# Patient Record
Sex: MALE | Race: WHITE | Employment: UNEMPLOYED | URBAN - NONMETROPOLITAN AREA
[De-identification: names, ages, dates, MRNs, and addresses within clinical notes are randomized per-mention and may not be internally consistent; named-entity substitution may affect disease eponyms.]

---

## 2019-07-24 ENCOUNTER — HOSPITAL ENCOUNTER (INPATIENT)
Age: 29
LOS: 9 days | Discharge: HOME | DRG: 885 | End: 2019-08-02
Attending: FAMILY MEDICINE | Admitting: PSYCHIATRY & NEUROLOGY
Payer: COMMERCIAL

## 2019-07-24 DIAGNOSIS — F32.A DEPRESSION WITH SUICIDAL IDEATION: Primary | ICD-10-CM

## 2019-07-24 DIAGNOSIS — R45.851 DEPRESSION WITH SUICIDAL IDEATION: Primary | ICD-10-CM

## 2019-07-24 LAB
ACETAMINOPHEN LEVEL: <15 UG/ML
ALBUMIN SERPL-MCNC: 3.9 G/DL (ref 3.5–5.2)
ALP BLD-CCNC: 66 U/L (ref 40–130)
ALT SERPL-CCNC: 55 U/L (ref 5–41)
AMPHETAMINE SCREEN, URINE: NEGATIVE
ANION GAP SERPL CALCULATED.3IONS-SCNC: 11 MMOL/L (ref 7–19)
APTT: 24.5 SEC (ref 26–36.2)
AST SERPL-CCNC: 36 U/L (ref 5–40)
BARBITURATE SCREEN URINE: NEGATIVE
BASOPHILS ABSOLUTE: 0.1 K/UL (ref 0–0.2)
BASOPHILS RELATIVE PERCENT: 0.8 % (ref 0–1)
BENZODIAZEPINE SCREEN, URINE: NEGATIVE
BILIRUB SERPL-MCNC: 0.4 MG/DL (ref 0.2–1.2)
BILIRUBIN URINE: NEGATIVE
BLOOD, URINE: NEGATIVE
BUN BLDV-MCNC: 14 MG/DL (ref 6–20)
CALCIUM SERPL-MCNC: 9.3 MG/DL (ref 8.6–10)
CANNABINOID SCREEN URINE: NEGATIVE
CHLORIDE BLD-SCNC: 104 MMOL/L (ref 98–111)
CLARITY: CLEAR
CO2: 28 MMOL/L (ref 22–29)
COCAINE METABOLITE SCREEN URINE: NEGATIVE
COLOR: YELLOW
CREAT SERPL-MCNC: 0.9 MG/DL (ref 0.5–1.2)
EOSINOPHILS ABSOLUTE: 0.3 K/UL (ref 0–0.6)
EOSINOPHILS RELATIVE PERCENT: 3.9 % (ref 0–5)
ETHANOL: <10 MG/DL (ref 0–0.08)
GFR NON-AFRICAN AMERICAN: >60
GLUCOSE BLD-MCNC: 99 MG/DL (ref 74–109)
GLUCOSE URINE: NEGATIVE MG/DL
HCT VFR BLD CALC: 40.2 % (ref 42–52)
HEMOGLOBIN: 12.7 G/DL (ref 14–18)
INR BLD: 1.04 (ref 0.88–1.18)
KETONES, URINE: NEGATIVE MG/DL
LEUKOCYTE ESTERASE, URINE: NEGATIVE
LYMPHOCYTES ABSOLUTE: 2.3 K/UL (ref 1.1–4.5)
LYMPHOCYTES RELATIVE PERCENT: 27.4 % (ref 20–40)
Lab: NORMAL
MCH RBC QN AUTO: 31.5 PG (ref 27–31)
MCHC RBC AUTO-ENTMCNC: 31.6 G/DL (ref 33–37)
MCV RBC AUTO: 99.8 FL (ref 80–94)
MONOCYTES ABSOLUTE: 0.6 K/UL (ref 0–0.9)
MONOCYTES RELATIVE PERCENT: 7.4 % (ref 0–10)
NEUTROPHILS ABSOLUTE: 5.1 K/UL (ref 1.5–7.5)
NEUTROPHILS RELATIVE PERCENT: 59.9 % (ref 50–65)
NITRITE, URINE: NEGATIVE
OPIATE SCREEN URINE: NEGATIVE
PDW BLD-RTO: 14.6 % (ref 11.5–14.5)
PH UA: 7 (ref 5–8)
PLATELET # BLD: 175 K/UL (ref 130–400)
PMV BLD AUTO: 10.4 FL (ref 9.4–12.4)
POTASSIUM SERPL-SCNC: 4.3 MMOL/L (ref 3.5–5)
PROTEIN UA: NEGATIVE MG/DL
PROTHROMBIN TIME: 13 SEC (ref 12–14.6)
RBC # BLD: 4.03 M/UL (ref 4.7–6.1)
SALICYLATE, SERUM: <3 MG/DL (ref 3–10)
SODIUM BLD-SCNC: 143 MMOL/L (ref 136–145)
SPECIFIC GRAVITY UA: 1.01 (ref 1–1.03)
T4 TOTAL: 6.6 UG/DL (ref 4.5–11.7)
TOTAL PROTEIN: 6.7 G/DL (ref 6.6–8.7)
TSH SERPL DL<=0.05 MIU/L-ACNC: 2.49 UIU/ML (ref 0.27–4.2)
URINE REFLEX TO CULTURE: NORMAL
UROBILINOGEN, URINE: 1 E.U./DL
WBC # BLD: 8.5 K/UL (ref 4.8–10.8)

## 2019-07-24 PROCEDURE — G0480 DRUG TEST DEF 1-7 CLASSES: HCPCS

## 2019-07-24 PROCEDURE — 81003 URINALYSIS AUTO W/O SCOPE: CPT

## 2019-07-24 PROCEDURE — 84436 ASSAY OF TOTAL THYROXINE: CPT

## 2019-07-24 PROCEDURE — 85730 THROMBOPLASTIN TIME PARTIAL: CPT

## 2019-07-24 PROCEDURE — 99285 EMERGENCY DEPT VISIT HI MDM: CPT | Performed by: FAMILY MEDICINE

## 2019-07-24 PROCEDURE — 80307 DRUG TEST PRSMV CHEM ANLYZR: CPT

## 2019-07-24 PROCEDURE — 84443 ASSAY THYROID STIM HORMONE: CPT

## 2019-07-24 PROCEDURE — 36415 COLL VENOUS BLD VENIPUNCTURE: CPT

## 2019-07-24 PROCEDURE — 85025 COMPLETE CBC W/AUTO DIFF WBC: CPT

## 2019-07-24 PROCEDURE — 85610 PROTHROMBIN TIME: CPT

## 2019-07-24 PROCEDURE — 6370000000 HC RX 637 (ALT 250 FOR IP): Performed by: FAMILY MEDICINE

## 2019-07-24 PROCEDURE — 6370000000 HC RX 637 (ALT 250 FOR IP): Performed by: NURSE PRACTITIONER

## 2019-07-24 PROCEDURE — 1240000000 HC EMOTIONAL WELLNESS R&B

## 2019-07-24 PROCEDURE — 99285 EMERGENCY DEPT VISIT HI MDM: CPT

## 2019-07-24 PROCEDURE — 80053 COMPREHEN METABOLIC PANEL: CPT

## 2019-07-24 RX ORDER — ACETAMINOPHEN 325 MG/1
650 TABLET ORAL EVERY 4 HOURS PRN
Status: DISCONTINUED | OUTPATIENT
Start: 2019-07-24 | End: 2019-08-02 | Stop reason: HOSPADM

## 2019-07-24 RX ORDER — TRAZODONE HYDROCHLORIDE 50 MG/1
50 TABLET ORAL NIGHTLY
Status: DISCONTINUED | OUTPATIENT
Start: 2019-07-24 | End: 2019-07-24

## 2019-07-24 RX ORDER — HYDROXYZINE HYDROCHLORIDE 10 MG/1
10 TABLET, FILM COATED ORAL 3 TIMES DAILY PRN
Status: DISCONTINUED | OUTPATIENT
Start: 2019-07-24 | End: 2019-07-25

## 2019-07-24 RX ORDER — TRAZODONE HYDROCHLORIDE 50 MG/1
50 TABLET ORAL NIGHTLY PRN
Status: DISCONTINUED | OUTPATIENT
Start: 2019-07-24 | End: 2019-08-02 | Stop reason: HOSPADM

## 2019-07-24 RX ORDER — LORAZEPAM 1 MG/1
1 TABLET ORAL ONCE
Status: COMPLETED | OUTPATIENT
Start: 2019-07-24 | End: 2019-07-24

## 2019-07-24 RX ORDER — DOXEPIN HYDROCHLORIDE 25 MG/1
25 CAPSULE ORAL NIGHTLY
Status: ON HOLD | COMMUNITY
End: 2019-08-01 | Stop reason: HOSPADM

## 2019-07-24 RX ADMIN — LORAZEPAM 1 MG: 1 TABLET ORAL at 10:40

## 2019-07-24 RX ADMIN — HYDROXYZINE HYDROCHLORIDE 10 MG: 10 TABLET, FILM COATED ORAL at 15:53

## 2019-07-24 RX ADMIN — TRAZODONE HYDROCHLORIDE 50 MG: 50 TABLET ORAL at 21:17

## 2019-07-24 ASSESSMENT — ENCOUNTER SYMPTOMS
COUGH: 0
COLOR CHANGE: 0
NAUSEA: 0
SHORTNESS OF BREATH: 0
ABDOMINAL PAIN: 0
SORE THROAT: 0
WHEEZING: 0
DIARRHEA: 0
VOMITING: 0
BACK PAIN: 0

## 2019-07-24 ASSESSMENT — SLEEP AND FATIGUE QUESTIONNAIRES
DO YOU USE A SLEEP AID: YES
SLEEP PATTERN: DIFFICULTY ARISING
DIFFICULTY ARISING: YES
DO YOU HAVE DIFFICULTY SLEEPING: YES
RESTFUL SLEEP: NO
DIFFICULTY FALLING ASLEEP: YES
AVERAGE NUMBER OF SLEEP HOURS: 4
DIFFICULTY STAYING ASLEEP: YES

## 2019-07-24 ASSESSMENT — PATIENT HEALTH QUESTIONNAIRE - PHQ9: SUM OF ALL RESPONSES TO PHQ QUESTIONS 1-9: 20

## 2019-07-24 ASSESSMENT — LIFESTYLE VARIABLES: HISTORY_ALCOHOL_USE: NO

## 2019-07-24 NOTE — PLAN OF CARE
Group Therapy Note     Date: 7/24/2019  Start Time: 1430  End Time:  0456  Number of Participants: 13      Type of Group: Cognitive Skills     Wellness Binder Information  Module Name:  emotional wellness  Session Number:  1     Patient's Goal:  validation of feelings     Notes:  pt was verbally prompted to attend group. Pt refused. Information about validation of feelings was provided. Status After Intervention:       Participation Level:      Participation Quality:         Speech:           Thought Process/Content:         Affective Functioning:         Mood:         Level of consciousness:          Response to Learning:         Endings:      Modes of Intervention:         Discipline Responsible: Psychoeducational Specialist        Signature:  Roxana Vazquez

## 2019-07-24 NOTE — ED PROVIDER NOTES
DIAGNOSIS/MDM:   Vitals:    Vitals:    07/24/19 0820   BP: 136/72   Pulse: 87   Resp: 17   Temp: 97.6 °F (36.4 °C)   SpO2: 98%       MDM    Reassessment      CONSULTS:  IP CONSULT TO INTERNAL MEDICINE  IP CONSULT TO SOCIAL WORK    PROCEDURES:  Unless otherwise noted below, none     Procedures    FINAL IMPRESSION      1. Depression with suicidal ideation          DISPOSITION/PLAN   DISPOSITION Decision To Admit 07/24/2019 11:17:01 AM      PATIENT REFERRED TO:  No follow-up provider specified.     DISCHARGE MEDICATIONS:  New Prescriptions    No medications on file          (Please note that portions of this note were completed with a voice recognition program.  Efforts were made to edit thedictations but occasionally words are mis-transcribed.)    Koko Ortega MD (electronically signed)  Attending Emergency Physician         Ruben Rm MD  07/24/19 5228

## 2019-07-24 NOTE — PROGRESS NOTES
Admission Note      Reason for admission/Target Symptom: Patient admitted to [facility] due to complaints of suicidal ideation and homicidal ideation he states that he has had thoughts of hanging himself and choking the preacher out. The patient is currently and a homeless shelter awaiting a rehab bed and he states he has not taken his Remeron or anxiety medicine in approximately 2 weeks. He had been told at one point by his  if that if he has bad thoughts to tell someone and he did. Diagnoses: Depression NOS   UDS: Neg  BAL:  Neg    SW met with treatment team to discuss patient's treatment including care planning, discharge planning, and follow-up needs. Pt has been admitted to [facility]. Treatment team has identified patient's discharge needs as medication management and outpatient therapy/counseling. Pt confirmed  the need for ongoing treatment post inpatient stay. Pt was also provided a handout of contact information for drug and alcohol treatment centers and other community support service such as [facility], AA, and [facility] .

## 2019-07-25 PROBLEM — R45.851 SUICIDAL IDEATION: Status: ACTIVE | Noted: 2019-07-25

## 2019-07-25 PROBLEM — R45.850 HOMICIDAL IDEATION: Status: ACTIVE | Noted: 2019-07-25

## 2019-07-25 PROBLEM — F25.0 SCHIZOAFFECTIVE DISORDER, BIPOLAR TYPE (HCC): Status: ACTIVE | Noted: 2019-07-25

## 2019-07-25 LAB
VITAMIN B-12: 308 PG/ML (ref 211–946)
VITAMIN D 25-HYDROXY: 23.2 NG/ML

## 2019-07-25 PROCEDURE — 36415 COLL VENOUS BLD VENIPUNCTURE: CPT

## 2019-07-25 PROCEDURE — 6370000000 HC RX 637 (ALT 250 FOR IP): Performed by: FAMILY MEDICINE

## 2019-07-25 PROCEDURE — 6370000000 HC RX 637 (ALT 250 FOR IP): Performed by: NURSE PRACTITIONER

## 2019-07-25 PROCEDURE — 90792 PSYCH DIAG EVAL W/MED SRVCS: CPT | Performed by: NURSE PRACTITIONER

## 2019-07-25 PROCEDURE — 82306 VITAMIN D 25 HYDROXY: CPT

## 2019-07-25 PROCEDURE — 82607 VITAMIN B-12: CPT

## 2019-07-25 PROCEDURE — 1240000000 HC EMOTIONAL WELLNESS R&B

## 2019-07-25 RX ORDER — ERGOCALCIFEROL (VITAMIN D2) 1250 MCG
50000 CAPSULE ORAL WEEKLY
Qty: 11 CAPSULE | Refills: 0 | Status: SHIPPED | OUTPATIENT
Start: 2019-07-25 | End: 2019-08-01

## 2019-07-25 RX ORDER — OLANZAPINE 10 MG/1
10 TABLET ORAL NIGHTLY
Status: DISCONTINUED | OUTPATIENT
Start: 2019-07-25 | End: 2019-07-28

## 2019-07-25 RX ORDER — OLANZAPINE 5 MG/1
5 TABLET, ORALLY DISINTEGRATING ORAL ONCE
Status: COMPLETED | OUTPATIENT
Start: 2019-07-25 | End: 2019-07-25

## 2019-07-25 RX ORDER — LITHIUM CARBONATE 150 MG/1
150 CAPSULE ORAL 2 TIMES DAILY WITH MEALS
Status: DISCONTINUED | OUTPATIENT
Start: 2019-07-25 | End: 2019-07-26

## 2019-07-25 RX ORDER — CHOLECALCIFEROL (VITAMIN D3) 125 MCG
500 CAPSULE ORAL DAILY
Status: DISCONTINUED | OUTPATIENT
Start: 2019-07-25 | End: 2019-08-02 | Stop reason: HOSPADM

## 2019-07-25 RX ORDER — BUSPIRONE HYDROCHLORIDE 10 MG/1
10 TABLET ORAL 3 TIMES DAILY
Status: DISCONTINUED | OUTPATIENT
Start: 2019-07-25 | End: 2019-07-29

## 2019-07-25 RX ORDER — HYDROXYZINE HYDROCHLORIDE 25 MG/1
25 TABLET, FILM COATED ORAL 3 TIMES DAILY PRN
Status: DISCONTINUED | OUTPATIENT
Start: 2019-07-25 | End: 2019-07-28

## 2019-07-25 RX ORDER — ERGOCALCIFEROL 1.25 MG/1
50000 CAPSULE ORAL WEEKLY
Status: DISCONTINUED | OUTPATIENT
Start: 2019-07-25 | End: 2019-08-02 | Stop reason: HOSPADM

## 2019-07-25 RX ADMIN — OLANZAPINE 5 MG: 5 TABLET, ORALLY DISINTEGRATING ORAL at 12:25

## 2019-07-25 RX ADMIN — LITHIUM CARBONATE 150 MG: 150 CAPSULE, GELATIN COATED ORAL at 17:09

## 2019-07-25 RX ADMIN — TRAZODONE HYDROCHLORIDE 50 MG: 50 TABLET ORAL at 21:07

## 2019-07-25 RX ADMIN — ERGOCALCIFEROL 50000 UNITS: 1.25 CAPSULE ORAL at 14:43

## 2019-07-25 RX ADMIN — CYANOCOBALAMIN TAB 500 MCG 500 MCG: 500 TAB at 14:43

## 2019-07-25 RX ADMIN — BUSPIRONE HYDROCHLORIDE 10 MG: 10 TABLET ORAL at 14:43

## 2019-07-25 RX ADMIN — HYDROXYZINE HYDROCHLORIDE 10 MG: 10 TABLET, FILM COATED ORAL at 09:10

## 2019-07-25 RX ADMIN — OLANZAPINE 10 MG: 10 TABLET, FILM COATED ORAL at 21:07

## 2019-07-25 RX ADMIN — BUSPIRONE HYDROCHLORIDE 10 MG: 10 TABLET ORAL at 21:07

## 2019-07-25 NOTE — PROGRESS NOTES
CSW completed psychosocial and CSSR-S on this date. Pt long and short term goals discussed. Pt voiced understanding. Treatment plan sheet signed. Pt verbalized understanding of the treatment plan. Pt participated in goals and objectives of the treatment plan. It was identified that pt will require outpatient follow up appointments at local [facility] behavioral health facility such as[address]. Pt validated need for appointments. Pt was also provided a handout of contact information for drug and alcohol treatment centers and other community support service such as [facility] and [facility].       In the last 6 months has the pt been danger to self: YES  In the last 6 months has the pt been danger to others:  YES     Provided pt with [employer] handout entitled \"Quitting Smoking,\" reviewed handout with pt addressing dangers of smoking, developing coping skills, and providing basic information about quitting.     Patient declined practical counseling of tobacco practical counseling during the hospital stay

## 2019-07-26 PROBLEM — F25.9 SCHIZOAFFECTIVE DISORDER (HCC): Status: ACTIVE | Noted: 2019-07-26

## 2019-07-26 PROCEDURE — 6370000000 HC RX 637 (ALT 250 FOR IP): Performed by: FAMILY MEDICINE

## 2019-07-26 PROCEDURE — 1240000000 HC EMOTIONAL WELLNESS R&B

## 2019-07-26 PROCEDURE — 99231 SBSQ HOSP IP/OBS SF/LOW 25: CPT | Performed by: NURSE PRACTITIONER

## 2019-07-26 PROCEDURE — 6370000000 HC RX 637 (ALT 250 FOR IP): Performed by: NURSE PRACTITIONER

## 2019-07-26 RX ORDER — LITHIUM CARBONATE 150 MG/1
150 CAPSULE ORAL DAILY
Status: DISCONTINUED | OUTPATIENT
Start: 2019-07-27 | End: 2019-07-28

## 2019-07-26 RX ORDER — LITHIUM CARBONATE 150 MG/1
300 CAPSULE ORAL NIGHTLY
Status: DISCONTINUED | OUTPATIENT
Start: 2019-07-26 | End: 2019-07-28

## 2019-07-26 RX ADMIN — TRAZODONE HYDROCHLORIDE 50 MG: 50 TABLET ORAL at 20:58

## 2019-07-26 RX ADMIN — LITHIUM CARBONATE 150 MG: 150 CAPSULE, GELATIN COATED ORAL at 08:15

## 2019-07-26 RX ADMIN — BUSPIRONE HYDROCHLORIDE 10 MG: 10 TABLET ORAL at 08:15

## 2019-07-26 RX ADMIN — HYDROXYZINE HYDROCHLORIDE 25 MG: 25 TABLET, FILM COATED ORAL at 12:55

## 2019-07-26 RX ADMIN — HYDROXYZINE HYDROCHLORIDE 25 MG: 25 TABLET, FILM COATED ORAL at 20:58

## 2019-07-26 RX ADMIN — BUSPIRONE HYDROCHLORIDE 10 MG: 10 TABLET ORAL at 20:58

## 2019-07-26 RX ADMIN — CYANOCOBALAMIN TAB 500 MCG 500 MCG: 500 TAB at 08:15

## 2019-07-26 RX ADMIN — BUSPIRONE HYDROCHLORIDE 10 MG: 10 TABLET ORAL at 12:55

## 2019-07-26 RX ADMIN — LITHIUM CARBONATE 300 MG: 150 CAPSULE, GELATIN COATED ORAL at 20:58

## 2019-07-26 RX ADMIN — OLANZAPINE 10 MG: 10 TABLET, FILM COATED ORAL at 20:58

## 2019-07-26 NOTE — PLAN OF CARE
Group Therapy Note     Date: 7/26/2019  Start Time: 1000  End Time:  9016  Number of Participants: 10     Type of Group: Psychoeducation     Wellness Binder Information  Module Name:  social wellness  Session Number:  1     Patient's Goal:  your support network     Notes:  pt was verbally prompted to attend group. Pt refused. Information about support network was provided. Status After Intervention:       Participation Level:      Participation Quality:         Speech:           Thought Process/Content:         Affective Functioning:         Mood:         Level of consciousness:          Response to Learning:         Endings:      Modes of Intervention:         Discipline Responsible: Psychoeducational Specialist        Signature:  Uriel Granados

## 2019-07-26 NOTE — PROGRESS NOTES
[address] [place] [address]      Psychiatric Progress Note    Name:  Vanna Balmorhea  Date:  7/26/2019  Age:  34 y.o. Sex:  male  Ethnicity:   Primary Care Physician:  No primary care provider on file. Patient Care Team:  No care team member to display  Chief Complaint: \"I am suicidal, I have a plan every time. \"        Historian:patient  Complaint Type: anxiety, decreased appetite, depression, fatigue, irritability, loss of interest in favorite activities, mood swings, sleep disturbance and tobacco use  Course of Symptoms: ongoing  Precipitating Factors: history of mental illness      Subjective  Patient reports sleep as \"it was ok. \" He continues to reports suicidal ideation with thoughts to hang himself. He does contract for safety at this time and reports that he will let staff know if he feels unsafe. He denies HI today. He reports auditory hallucinations as \"me talking to myself. \" He denies visual hallucinations. He endorses feeling paranoid about \"everyone\" and he doesn't want to come out of his room. His is disheveled and has not showered in a few days. He has been mostly isolative to his room. Patient has been calm and cooperative with staff and peers. Patient has been compliant with medications. Patient has not been attending groups. Patient reports appetite as \"it's good. \"  Patient reports no side effects from medications. Previous Psychiatric/Substance Use History      Medical History:  Past Medical History:   Diagnosis Date    B[facility] depression ([facility] [address]     Depression     PTSD (post-traumatic stress disorder)     does not know from what source        GUERRA History:   Social History     Substance and Sexual Activity   Alcohol Use Not on file         Social History     Substance and Sexual Activity   Drug Use Yes    Types: Methamphetamines        Social History     Tobacco Use   Smoking Status Current Every Day Smoker        Family History:     History reviewed.  No pertinent hydroxide (MILK OF MAGNESIA) 400 MG/5ML suspension 30 mL  30 mL Oral Daily PRN Hayden Rodriguez MD        traZODone (DESYREL) tablet 50 mg  50 mg Oral Nightly PRN TOMI Day   50 mg at 07/25/19 2107       Psychotherapy:   SUPPORTIVE    DSM V Diagnoses:      Schizoaffective disorder, bipolar type [facility])      ACTIVE MEDICAL PROBLEMS:  Principal Problem:    Schizoaffective disorder, bipolar type ([address]  Active Problems:    Suicidal ideation    Homicidal ideation  Resolved Problems:    * No resolved hospital problems. *            Plan:    Encourage group therapy  15 minute safety checks  Medical monitoring by Dr. Amaury Aguiar and associates  Continue current therapy and medications  Increase pm dose of Lithium to 300 MG PO NIGHTLY  Lithium level on Monday am    Amount of time spent with patient:  15 minutes with greater than 50% of the time spent in counseling and collaboration of care.     TOMI Day  Clinician Signature: signed electronically

## 2019-07-26 NOTE — PLAN OF CARE
Group Therapy Note     Date: 7/26/2019  Start Time: 3531  End Time:  1600  Number of Participants: 3     Type of Group: Recovery     Wellness Binder Information  Module Name:  relapse prevention  Session Number:  3     Patient's Goal:  too much stress can lead to relapse     Notes:  pt was verbally prompted to attend group. Pt refused. Information about relapse prevention was provided. Status After Intervention:       Participation Level:      Participation Quality:         Speech:           Thought Process/Content:         Affective Functioning:         Mood:         Level of consciousness:          Response to Learning:         Endings:      Modes of Intervention:         Discipline Responsible: Psychoeducational Specialist        Signature:  Uriel Granados

## 2019-07-27 PROCEDURE — 99233 SBSQ HOSP IP/OBS HIGH 50: CPT | Performed by: PSYCHIATRY & NEUROLOGY

## 2019-07-27 PROCEDURE — 6370000000 HC RX 637 (ALT 250 FOR IP): Performed by: NURSE PRACTITIONER

## 2019-07-27 PROCEDURE — 6370000000 HC RX 637 (ALT 250 FOR IP): Performed by: FAMILY MEDICINE

## 2019-07-27 PROCEDURE — 1240000000 HC EMOTIONAL WELLNESS R&B

## 2019-07-27 RX ADMIN — LITHIUM CARBONATE 300 MG: 150 CAPSULE, GELATIN COATED ORAL at 20:31

## 2019-07-27 RX ADMIN — CYANOCOBALAMIN TAB 500 MCG 500 MCG: 500 TAB at 09:48

## 2019-07-27 RX ADMIN — BUSPIRONE HYDROCHLORIDE 10 MG: 10 TABLET ORAL at 20:30

## 2019-07-27 RX ADMIN — BUSPIRONE HYDROCHLORIDE 10 MG: 10 TABLET ORAL at 09:48

## 2019-07-27 RX ADMIN — OLANZAPINE 10 MG: 10 TABLET, FILM COATED ORAL at 20:30

## 2019-07-27 RX ADMIN — LITHIUM CARBONATE 150 MG: 150 CAPSULE, GELATIN COATED ORAL at 09:48

## 2019-07-27 RX ADMIN — TRAZODONE HYDROCHLORIDE 50 MG: 50 TABLET ORAL at 20:30

## 2019-07-27 RX ADMIN — HYDROXYZINE HYDROCHLORIDE 25 MG: 25 TABLET, FILM COATED ORAL at 17:32

## 2019-07-27 RX ADMIN — BUSPIRONE HYDROCHLORIDE 10 MG: 10 TABLET ORAL at 13:56

## 2019-07-27 NOTE — PROGRESS NOTES
Group Therapy Note    Start Time: 900  End Time:  365  Number of Participants: 8    Type of Group: Community Meeting       Patient's Goal:  \"being here & now\"      Notes:      Participation Level:  Active Listener       Participation Quality: Appropriate      Thought Process/Content: Logical      Affective Functioning: Congruent      Mood: calm      Level of consciousness:  Alert      Modes of Intervention: Support      Discipline Responsible: Behavioral Health Tech II      Signature:  Litzy Pena

## 2019-07-27 NOTE — GROUP NOTE
Group Therapy Note    Date: July 27    Group Start Time: 1315  Group End Time: 2123  Group Topic: Cognitive Skills    [facility] 6 ADULT [facility]    Lory Abraham             Patient's Goal: Happiness    Notes: Pt acknowledged that one must make the choice to stay positive in any situation and choose to be happy no matter what the circumstances are. Status After Intervention:  Unchanged    Participation Level:  Active Listener    Participation Quality: Attentive and Sharing      Speech:  normal      Thought Process/Content: Logical      Affective Functioning: Congruent      Mood: depressed      Level of consciousness:  Attentive      Response to Learning: Able to verbalize current knowledge/experience      Endings: None Reported    Modes of Intervention: Support      Discipline Responsible: Psychoeducational Specialist      Signature:  Lory Abraham

## 2019-07-28 LAB
CHOLESTEROL, TOTAL: 183 MG/DL (ref 160–199)
HBA1C MFR BLD: 5.2 % (ref 4–6)
HDLC SERPL-MCNC: 60 MG/DL (ref 55–121)
LDL CHOLESTEROL CALCULATED: 104 MG/DL
TRIGL SERPL-MCNC: 97 MG/DL (ref 0–149)

## 2019-07-28 PROCEDURE — 83036 HEMOGLOBIN GLYCOSYLATED A1C: CPT

## 2019-07-28 PROCEDURE — 36415 COLL VENOUS BLD VENIPUNCTURE: CPT

## 2019-07-28 PROCEDURE — 1240000000 HC EMOTIONAL WELLNESS R&B

## 2019-07-28 PROCEDURE — 6370000000 HC RX 637 (ALT 250 FOR IP): Performed by: PSYCHIATRY & NEUROLOGY

## 2019-07-28 PROCEDURE — 6370000000 HC RX 637 (ALT 250 FOR IP): Performed by: FAMILY MEDICINE

## 2019-07-28 PROCEDURE — 6370000000 HC RX 637 (ALT 250 FOR IP): Performed by: NURSE PRACTITIONER

## 2019-07-28 PROCEDURE — 99231 SBSQ HOSP IP/OBS SF/LOW 25: CPT | Performed by: PSYCHIATRY & NEUROLOGY

## 2019-07-28 PROCEDURE — 80061 LIPID PANEL: CPT

## 2019-07-28 RX ORDER — CLONAZEPAM 0.5 MG/1
0.5 TABLET ORAL 3 TIMES DAILY PRN
Status: DISCONTINUED | OUTPATIENT
Start: 2019-07-28 | End: 2019-07-30

## 2019-07-28 RX ORDER — LITHIUM CARBONATE 150 MG/1
300 CAPSULE ORAL
Status: DISCONTINUED | OUTPATIENT
Start: 2019-07-28 | End: 2019-08-02 | Stop reason: HOSPADM

## 2019-07-28 RX ORDER — HYDROXYZINE HYDROCHLORIDE 25 MG/1
50 TABLET, FILM COATED ORAL EVERY 4 HOURS PRN
Status: DISCONTINUED | OUTPATIENT
Start: 2019-07-28 | End: 2019-08-02 | Stop reason: HOSPADM

## 2019-07-28 RX ADMIN — BUSPIRONE HYDROCHLORIDE 10 MG: 10 TABLET ORAL at 08:40

## 2019-07-28 RX ADMIN — LITHIUM CARBONATE 300 MG: 150 CAPSULE, GELATIN COATED ORAL at 17:28

## 2019-07-28 RX ADMIN — HYDROXYZINE HYDROCHLORIDE 50 MG: 25 TABLET, FILM COATED ORAL at 17:29

## 2019-07-28 RX ADMIN — CLONAZEPAM 0.5 MG: 0.5 TABLET ORAL at 13:12

## 2019-07-28 RX ADMIN — BUSPIRONE HYDROCHLORIDE 10 MG: 10 TABLET ORAL at 20:13

## 2019-07-28 RX ADMIN — TRAZODONE HYDROCHLORIDE 50 MG: 50 TABLET ORAL at 20:13

## 2019-07-28 RX ADMIN — LITHIUM CARBONATE 150 MG: 150 CAPSULE, GELATIN COATED ORAL at 08:41

## 2019-07-28 RX ADMIN — OLANZAPINE 15 MG: 5 TABLET, FILM COATED ORAL at 20:13

## 2019-07-28 RX ADMIN — LITHIUM CARBONATE 300 MG: 150 CAPSULE, GELATIN COATED ORAL at 13:12

## 2019-07-28 RX ADMIN — BUSPIRONE HYDROCHLORIDE 10 MG: 10 TABLET ORAL at 13:12

## 2019-07-28 RX ADMIN — CYANOCOBALAMIN TAB 500 MCG 500 MCG: 500 TAB at 08:40

## 2019-07-28 RX ADMIN — HYDROXYZINE HYDROCHLORIDE 50 MG: 25 TABLET, FILM COATED ORAL at 22:44

## 2019-07-28 RX ADMIN — HYDROXYZINE HYDROCHLORIDE 25 MG: 25 TABLET, FILM COATED ORAL at 12:33

## 2019-07-28 NOTE — GROUP NOTE
Group Therapy Note    Date: July 28    Group Start Time: 1240  Group End Time: 1330  Group Topic: Cognitive Skills    [facility] 6 ADULT [facility]HI    Diana Grissom             Patient's Goal: Managing Depression    Notes:  SW used verbal prompts to encourage pt to attend group but pt refused.       Discipline Responsible: Psychoeducational Specialist      Signature:  Diana Grissom

## 2019-07-28 NOTE — PROGRESS NOTES
[facility] Adult Unit Daily Assessment  Nursing Progress Note     Room: [facility]/607-01   Name: Vanna Dean   Age: 34 y.o. Gender: male   Dx: Schizoaffective disorder, bipolar type ([address]  Precautions: suicide risk  Inpatient Status: voluntary         Sleep: Yes,   Sleep Quality Good   Hours Slept: see flow sheet   Sleep Medications: Yes  PRN Sleep Meds: Yes         Other PRN Meds: No   Med Compliant: Yes   Accu-Chek: No   Oxygen: No  CIWA/CINA: No             SI passive patient does contract for safety with staff   HI Negative for homicidal ideation        AVH:Absent        Depression: 7  Anxiety: 5-7        Appetite: good    Social: somewhat   Speech: normal   Appearance: poor hygiene     Group Participation: Yes  Participation Level: interactive   Participation Quality: good     Notes: Pt calm and cooperative, A&Ox4 at med pass. Pt asked about what medications he will be going home on. Pt has been in the TV area, somewhat social with peers most of the evening.     Electronically signed by oYgesh Maravilla RN on 7/27/2019 at 8:45 PM

## 2019-07-29 LAB — LITHIUM LEVEL: 0.3 MMOL/L (ref 0.6–1.2)

## 2019-07-29 PROCEDURE — 6370000000 HC RX 637 (ALT 250 FOR IP): Performed by: PSYCHIATRY & NEUROLOGY

## 2019-07-29 PROCEDURE — 6370000000 HC RX 637 (ALT 250 FOR IP): Performed by: NURSE PRACTITIONER

## 2019-07-29 PROCEDURE — 36415 COLL VENOUS BLD VENIPUNCTURE: CPT

## 2019-07-29 PROCEDURE — 99231 SBSQ HOSP IP/OBS SF/LOW 25: CPT | Performed by: NURSE PRACTITIONER

## 2019-07-29 PROCEDURE — 80178 ASSAY OF LITHIUM: CPT

## 2019-07-29 PROCEDURE — 1240000000 HC EMOTIONAL WELLNESS R&B

## 2019-07-29 PROCEDURE — 6370000000 HC RX 637 (ALT 250 FOR IP): Performed by: FAMILY MEDICINE

## 2019-07-29 RX ORDER — BUSPIRONE HYDROCHLORIDE 15 MG/1
15 TABLET ORAL 3 TIMES DAILY
Status: DISCONTINUED | OUTPATIENT
Start: 2019-07-29 | End: 2019-08-02 | Stop reason: HOSPADM

## 2019-07-29 RX ADMIN — HYDROXYZINE HYDROCHLORIDE 50 MG: 25 TABLET, FILM COATED ORAL at 17:17

## 2019-07-29 RX ADMIN — BUSPIRONE HYDROCHLORIDE 10 MG: 10 TABLET ORAL at 08:14

## 2019-07-29 RX ADMIN — LITHIUM CARBONATE 300 MG: 150 CAPSULE, GELATIN COATED ORAL at 14:12

## 2019-07-29 RX ADMIN — CLONAZEPAM 0.5 MG: 0.5 TABLET ORAL at 18:33

## 2019-07-29 RX ADMIN — BUSPIRONE HYDROCHLORIDE 15 MG: 15 TABLET ORAL at 20:52

## 2019-07-29 RX ADMIN — LITHIUM CARBONATE 300 MG: 150 CAPSULE, GELATIN COATED ORAL at 17:17

## 2019-07-29 RX ADMIN — CYANOCOBALAMIN TAB 500 MCG 500 MCG: 500 TAB at 08:14

## 2019-07-29 RX ADMIN — HYDROXYZINE HYDROCHLORIDE 50 MG: 25 TABLET, FILM COATED ORAL at 20:54

## 2019-07-29 RX ADMIN — HYDROXYZINE HYDROCHLORIDE 50 MG: 25 TABLET, FILM COATED ORAL at 08:17

## 2019-07-29 RX ADMIN — CLONAZEPAM 0.5 MG: 0.5 TABLET ORAL at 10:07

## 2019-07-29 RX ADMIN — OLANZAPINE 15 MG: 5 TABLET, FILM COATED ORAL at 20:52

## 2019-07-29 RX ADMIN — TRAZODONE HYDROCHLORIDE 50 MG: 50 TABLET ORAL at 20:52

## 2019-07-29 RX ADMIN — LITHIUM CARBONATE 300 MG: 150 CAPSULE, GELATIN COATED ORAL at 08:14

## 2019-07-29 RX ADMIN — BUSPIRONE HYDROCHLORIDE 15 MG: 15 TABLET ORAL at 14:11

## 2019-07-29 NOTE — PROGRESS NOTES
Treatment Team Note:     ALMA met with [address] team to discuss Pts TX and DC plans.      Progress/Behavior/Group Attendance: TBD     Target Symptoms/Reason for admission:  Patient admitted to [facility] due to complaints of suicidal ideation and homicidal ideation he states that he has had thoughts of hanging himself and choking the preacher out.  The patient is currently and a homeless shelter awaiting a rehab bed and he states he has not taken his Remeron or anxiety medicine in approximately 2 weeks. Bunny Wise had been told at one point by his  if that if he has bad thoughts to tell someone and he did.     Diagnoses: Depression NOS     UDS: Neg-      BAL: Neg     AftercarePlan: [address] lives with: ALMA will meet with pt to gather information.     Collateral obtained from: SW will meet with pt to gather release of information.   On:     Family Session: LYNDA     Misc:

## 2019-07-29 NOTE — PLAN OF CARE
Ongoing     Problem: Altered Mood, Deterioration in Function:  Goal: Ability to perform activities of daily living will improve  Description  Ability to perform activities of daily living will improve  Outcome: Ongoing  Goal: Able to verbalize reality based thinking  Description  Able to verbalize reality based thinking  Outcome: Ongoing  Goal: Skin appearance normal  Description  Skin appearance normal  Outcome: Ongoing  Goal: Maintenance of adequate nutrition will improve  Description  Maintenance of adequate nutrition will improve  Outcome: Ongoing  Goal: Ability to tolerate increased activity will improve  Description  Ability to tolerate increased activity will improve  Outcome: Ongoing  Goal: Participates in care planning  Description  Participates in care planning  Outcome: Ongoing  Goal: Patient specific goal  Description  Patient specific goal  Outcome: Ongoing     Problem: Risk of Harm:  Goal: Ability to remain free from injury will improve  Description  Ability to remain free from injury will improve  Outcome: Ongoing     Problem: Suicide risk  Description  Suicide risk  Goal: Provide patient with safe environment  Description  Provide patient with safe environment  Outcome: Ongoing

## 2019-07-29 NOTE — PLAN OF CARE
Problem: Altered Mood, Depressive Behavior:  Goal: Able to verbalize acceptance of life and situations over which he or she has no control  7/29/2019 1055 by Eros Salomon  Outcome: Ongoing    Group Therapy Note     Date: 7/29/2019  Start Time: 1000  End Time:  0313  Number of Participants: 11     Type of Group: Psychoeducation     Wellness Binder Information  Module Name:  emotional wellness  Session Number:  1     Patient's Goal:  validation of feelings     Notes:  pt acknowledged to have feelings validated it may be necessary to share feelings with others.      Status After Intervention:  Improved     Participation Level: Interactive     Participation Quality: Appropriate, Attentive and Sharing        Speech:  normal        Thought Process/Content: Logical        Affective Functioning: Congruent        Mood: congruent        Level of consciousness:  Alert, Oriented x4 and Attentive        Response to Learning: Able to verbalize current knowledge/experience        Endings: None Reported     Modes of Intervention: Education        Discipline Responsible: Psychoeducational Specialist        Signature:  Eros Salomon

## 2019-07-29 NOTE — PROGRESS NOTES
pertinent family history. Vital Signs:  Last set of tests and vitals:  Vitals:    07/29/19 0714   BP: 113/66   Pulse: 73   Resp: 20   Temp: 97.3 °F (36.3 °C)   SpO2: 97%          Mental Status:  Level of consciousness:  within normal limits and awake  Appearance:  well-appearing, street clothes, in chair, good grooming and good hygiene  Behavior/Motor:  no abnormalities noted  Attitude toward examiner:  cooperative, attentive and good eye contact  Speech:  normal rate and normal volume  Mood:  \"I am feeling a little better. \"  Affect:  mood congruent  Thought processes:  linear and goal directed  Thought content:  Homocidal ideation : denies  Suicidal Ideation:  passive  Delusions:  no evidence of delusions  Perceptual Disturbance:  auditory  Cognition:  oriented to person, place, and time  Concentration : denies  Memory intact for recent and remote  Fund of knowledge: below average  Abstract thinking:  adequate  Insight: fair  Judgment:  good     OLANZapine  15 mg Oral Nightly    lithium  300 mg Oral TID WC    busPIRone  10 mg Oral TID    vitamin D  50,000 Units Oral Weekly    vitamin B-12  500 mcg Oral Daily       Current Medications:  Current Facility-Administered Medications   Medication Dose Route Frequency Provider Last Rate Last Dose    hydrOXYzine (ATARAX) tablet 50 mg  50 mg Oral Q4H PRN Heidy Crane MD   50 mg at 07/29/19 0817    clonazePAM (KLONOPIN) tablet 0.5 mg  0.5 mg Oral TID PRN Heidy Crane MD   0.5 mg at 07/29/19 1007    OLANZapine (ZYPREXA) tablet 15 mg  15 mg Oral Nightly Heidy Crane MD   15 mg at 07/28/19 2013    lithium capsule 300 mg  300 mg Oral TID RICKY Craen MD   300 mg at 07/29/19 3039    busPIRone (BUSPAR) tablet 10 mg  10 mg Oral TID TOMI Kidd   10 mg at 07/29/19 9882    vitamin D (ERGOCALCIFEROL) capsule 50,000 Units  50,000 Units Oral Weekly Júnior Weeks MD   50,000 Units at 07/25/19 1443    vitamin B-12 (CYANOCOBALAMIN) tablet 500 mcg 500 mcg Oral Daily Oumou Munroe MD   500 mcg at 07/29/19 5957    acetaminophen (TYLENOL) tablet 650 mg  650 mg Oral Q4H PRN Yaniv Grant MD        magnesium hydroxide (MILK OF MAGNESIA) 400 MG/5ML suspension 30 mL  30 mL Oral Daily PRN Yaniv Grant MD        traZODone (DESYREL) tablet 50 mg  50 mg Oral Nightly PRN TOMI Ovalle   50 mg at 07/28/19 2013       Psychotherapy:   SUPPORTIVE    DSM V Diagnoses:      ACTIVE MEDICAL PROBLEMS:  Principal Problem:    Schizoaffective disorder, bipolar type ([facility] [address]  Active Problems:    Suicidal ideation    Homicidal ideation    Schizoaffective disorder ([facility] [address]  Resolved Problems:    * No resolved hospital problems. *            Plan:    Encourage group therapy  15 minute safety checks  Medical monitoring by Dr. Nabor Liu and associates  Continue current therapy and medications  Possible dc in the am    Amount of time spent with patient:  15 minutes with greater than 50% of the time spent in counseling and collaboration of care.     TOMI Ovalle  Clinician Signature: signed electronically

## 2019-07-30 PROCEDURE — 1240000000 HC EMOTIONAL WELLNESS R&B

## 2019-07-30 PROCEDURE — 99231 SBSQ HOSP IP/OBS SF/LOW 25: CPT | Performed by: NURSE PRACTITIONER

## 2019-07-30 PROCEDURE — 6370000000 HC RX 637 (ALT 250 FOR IP): Performed by: PSYCHIATRY & NEUROLOGY

## 2019-07-30 PROCEDURE — 6370000000 HC RX 637 (ALT 250 FOR IP): Performed by: NURSE PRACTITIONER

## 2019-07-30 PROCEDURE — 6370000000 HC RX 637 (ALT 250 FOR IP): Performed by: FAMILY MEDICINE

## 2019-07-30 RX ORDER — CLONAZEPAM 0.5 MG/1
0.5 TABLET ORAL 2 TIMES DAILY PRN
Status: DISCONTINUED | OUTPATIENT
Start: 2019-07-30 | End: 2019-08-02 | Stop reason: HOSPADM

## 2019-07-30 RX ADMIN — BUSPIRONE HYDROCHLORIDE 15 MG: 15 TABLET ORAL at 08:32

## 2019-07-30 RX ADMIN — LITHIUM CARBONATE 300 MG: 150 CAPSULE, GELATIN COATED ORAL at 16:48

## 2019-07-30 RX ADMIN — CYANOCOBALAMIN TAB 500 MCG 500 MCG: 500 TAB at 12:20

## 2019-07-30 RX ADMIN — HYDROXYZINE HYDROCHLORIDE 50 MG: 25 TABLET, FILM COATED ORAL at 20:26

## 2019-07-30 RX ADMIN — OLANZAPINE 15 MG: 5 TABLET, FILM COATED ORAL at 20:26

## 2019-07-30 RX ADMIN — CLONAZEPAM 0.5 MG: 0.5 TABLET ORAL at 12:20

## 2019-07-30 RX ADMIN — LITHIUM CARBONATE 300 MG: 150 CAPSULE, GELATIN COATED ORAL at 08:32

## 2019-07-30 RX ADMIN — BUSPIRONE HYDROCHLORIDE 15 MG: 15 TABLET ORAL at 15:04

## 2019-07-30 RX ADMIN — BUSPIRONE HYDROCHLORIDE 15 MG: 15 TABLET ORAL at 20:26

## 2019-07-30 RX ADMIN — TRAZODONE HYDROCHLORIDE 50 MG: 50 TABLET ORAL at 20:26

## 2019-07-30 RX ADMIN — LITHIUM CARBONATE 300 MG: 150 CAPSULE, GELATIN COATED ORAL at 12:20

## 2019-07-30 NOTE — PROGRESS NOTES
[address] [place] [address]      Psychiatric Progress Note    Name:  Arabella Manrique  Date:  7/30/2019  Age:  34 y.o. Sex:  male  Ethnicity:   Primary Care Physician:  No primary care provider on file. Patient Care Team:  No care team member to display  Chief Complaint: \"I was thinking about hanging myself. \"        Historian:patient  Complaint Type: anxiety, decreased appetite, depression, fatigue, irritability, loss of interest in favorite activities, mood swings, sleep disturbance and tobacco use  Course of Symptoms: ongoing  Precipitating Factors: history of mental illness        Subjective  Patient reports sleep as \"it was ok. \" He reports SI today with thoughts to hang himself. He states, \"I keep looking at that electrical wire hanging off of the roof and think about hanging myself. \" \"I gave up my string I had hidden in my shorts to the staff yesterday. \" He apparently had a nylon string in the pocket of his shorts. He is able to contract for safety at this time. He reports that if he feels that he is not able to contract for safety he will let the staff know. He denies HI or psychosis at this time. He has been coming out of his room more and is slowly start to socialize with peers. He has been attending some of the groups. Patient has been calm and cooperative with staff and peers. Patient has been compliant with medications. Patient reports appetite as \"good. \" Patient reports no side effects from medications.            Previous Psychiatric/Substance Use History      Medical History:  Past Medical History:   Diagnosis Date    [facility] [address]     Depression     PTSD (post-traumatic stress disorder)     does not know from what source        GUERRA History:   Social History     Substance and Sexual Activity   Alcohol Use Not on file         Social History     Substance and Sexual Activity   Drug Use Yes    Types: Methamphetamines        Social History     Tobacco Use   Smoking Status Current Every Day Smoker        Family History:     History reviewed. No pertinent family history. Vital Signs:  Last set of tests and vitals:  Vitals:    07/30/19 0841   BP: 129/72   Pulse: 99   Resp: 20   Temp: 98.4 °F (36.9 °C)   SpO2: 99%          Mental Status:  Level of consciousness:  within normal limits and awake  Appearance:  well-appearing, street clothes, in chair, fair grooming and good hygiene  Behavior/Motor:  no abnormalities noted  Attitude toward examiner:  cooperative, attentive and good eye contact  Speech:  normal rate and normal volume  Mood: \"I am still having suicidal thoughts. \"  Affect:  flat  Thought processes:  slow  Thought content:  Homocidal ideation :denies  Suicidal Ideation:  with plan to hang himself  Delusions:  paranoid  Perceptual Disturbance:  denies any perceptual disturbance  Cognition:  oriented to person, place, and time  Concentration : fair  Memory intact for recent and remote  Fund of knowledge:  average  Abstract thinking:  adequate  Insight:  poor  Judgment:  fair     busPIRone  15 mg Oral TID    OLANZapine  15 mg Oral Nightly    lithium  300 mg Oral TID     vitamin D  50,000 Units Oral Weekly    vitamin B-12  500 mcg Oral Daily       Current Medications:  Current Facility-Administered Medications   Medication Dose Route Frequency Provider Last Rate Last Dose    clonazePAM (KLONOPIN) tablet 0.5 mg  0.5 mg Oral BID PRN Peerafy Selfd, APRN        busPIRone (BUSPAR) tablet 15 mg  15 mg Oral TID Pee Blackdougied, APRN   15 mg at 07/30/19 0832    hydrOXYzine (ATARAX) tablet 50 mg  50 mg Oral Q4H PRN Heidy Crane MD   50 mg at 07/29/19 2054    OLANZapine (ZYPREXA) tablet 15 mg  15 mg Oral Nightly Heidy Crane MD   15 mg at 07/29/19 2052    lithium capsule 300 mg  300 mg Oral TID  Heidy Crane MD   300 mg at 07/30/19 7308    vitamin D (ERGOCALCIFEROL) capsule 50,000 Units  50,000 Units Oral Weekly Júnior Weeks MD   50,000 Units at 07/25/19

## 2019-07-30 NOTE — PROGRESS NOTES
alcohol. \" reports he smokes \" pot\" and states, but I don't consider that a drug. He also reports he has used Meth and \" likes it\" . He is more social this afternoon and attending some groups.      Pat Saavedra RN

## 2019-07-30 NOTE — PROGRESS NOTES
[facility] Adult Unit Daily Assessment  Nursing Progress Note    Room: 06/607-01   Name: Maik Andrews   Age: 34 y.o. Gender: male   Dx: Schizoaffective disorder, bipolar type ([address]  Precautions: Suicide Risk  Inpatient Status: voluntary       Sleep: Yes,   Sleep Quality Good   Hours Slept: See rounding sheet  Sleep Medications: Yes  PRN Sleep Meds: No       Other PRN Meds: No   Med Compliant: Yes   Accu-Chek: No   Oxygen: No  CIWA/CINA: No          SI denies suicidal ideation    HI Negative for homicidal ideation        AVH:Absent      Depression: 0   Anxiety: 3       Appetite: good    Social: Yes   Speech: normal   Appearance: appropriately dressed and healthy looking    Notes: Patient stated that he was physically tired. Patient was calm and cooperative with staff and peers. Will continue to monitor.      Electronically signed by Jaspal Zeng RN on 7/30/19 at 12:55 AM

## 2019-07-30 NOTE — PROGRESS NOTES
Group Therapy Note    Start Time: 800  End Time:  800  Number of Participants: 10    Type of Group: Community Meeting       Patient's Goal:  Stay, think postive      Notes:      Participation Level:  Active Listener       Participation Quality: Appropriate      Thought Process/Content: Logical      Affective Functioning: Congruent      Mood: calm      Level of consciousness:  Alert      Modes of Intervention: Support      Discipline Responsible: Behavioral Health Tech II      Signature:  Candice Alexandre

## 2019-07-31 PROCEDURE — 6370000000 HC RX 637 (ALT 250 FOR IP): Performed by: FAMILY MEDICINE

## 2019-07-31 PROCEDURE — 99231 SBSQ HOSP IP/OBS SF/LOW 25: CPT | Performed by: NURSE PRACTITIONER

## 2019-07-31 PROCEDURE — 6370000000 HC RX 637 (ALT 250 FOR IP): Performed by: PSYCHIATRY & NEUROLOGY

## 2019-07-31 PROCEDURE — 6370000000 HC RX 637 (ALT 250 FOR IP): Performed by: NURSE PRACTITIONER

## 2019-07-31 PROCEDURE — 1240000000 HC EMOTIONAL WELLNESS R&B

## 2019-07-31 RX ORDER — OLANZAPINE 10 MG/1
10 TABLET ORAL NIGHTLY
Status: DISCONTINUED | OUTPATIENT
Start: 2019-07-31 | End: 2019-08-02 | Stop reason: HOSPADM

## 2019-07-31 RX ADMIN — TRAZODONE HYDROCHLORIDE 50 MG: 50 TABLET ORAL at 20:34

## 2019-07-31 RX ADMIN — LITHIUM CARBONATE 300 MG: 150 CAPSULE, GELATIN COATED ORAL at 13:36

## 2019-07-31 RX ADMIN — CYANOCOBALAMIN TAB 500 MCG 500 MCG: 500 TAB at 09:26

## 2019-07-31 RX ADMIN — CLONAZEPAM 0.5 MG: 0.5 TABLET ORAL at 20:34

## 2019-07-31 RX ADMIN — OLANZAPINE 10 MG: 10 TABLET, FILM COATED ORAL at 20:34

## 2019-07-31 RX ADMIN — LITHIUM CARBONATE 300 MG: 150 CAPSULE, GELATIN COATED ORAL at 18:12

## 2019-07-31 RX ADMIN — BUSPIRONE HYDROCHLORIDE 15 MG: 15 TABLET ORAL at 09:26

## 2019-07-31 RX ADMIN — BUSPIRONE HYDROCHLORIDE 15 MG: 15 TABLET ORAL at 20:34

## 2019-07-31 RX ADMIN — BUSPIRONE HYDROCHLORIDE 15 MG: 15 TABLET ORAL at 13:36

## 2019-07-31 RX ADMIN — LITHIUM CARBONATE 300 MG: 150 CAPSULE, GELATIN COATED ORAL at 09:26

## 2019-07-31 NOTE — PLAN OF CARE
Problem: Altered Mood, Depressive Behavior:  Goal: Able to verbalize acceptance of life and situations over which he or she has no control  Description  Able to verbalize acceptance of life and situations over which he or she has no control  Outcome: Ongoing  Goal: Able to verbalize and/or display a decrease in depressive symptoms  Description  Able to verbalize and/or display a decrease in depressive symptoms  Outcome: Ongoing  Goal: Ability to disclose and discuss suicidal ideas will improve  Description  Ability to disclose and discuss suicidal ideas will improve  Outcome: Ongoing  Goal: Able to verbalize support systems  Description  Able to verbalize support systems  Outcome: Ongoing  Goal: Absence of self-harm  Description  Absence of self-harm  Outcome: Ongoing  Goal: Patient specific goal  Description  Patient specific goal  Outcome: Ongoing  Goal: Participates in care planning  Description  Participates in care planning  Outcome: Ongoing     Problem: Depressive Behavior With or Without Suicide Precautions:  Goal: Able to verbalize acceptance of life and situations over which he or she has no control  Description  Able to verbalize acceptance of life and situations over which he or she has no control  Outcome: Ongoing  Goal: Able to verbalize and/or display a decrease in depressive symptoms  Description  Able to verbalize and/or display a decrease in depressive symptoms  Outcome: Ongoing  Goal: Ability to disclose and discuss suicidal ideas will improve  Description  Ability to disclose and discuss suicidal ideas will improve  Outcome: Ongoing  Goal: Able to verbalize support systems  Description  Able to verbalize support systems  Outcome: Ongoing  Goal: Absence of self-harm  Description  Absence of self-harm  Outcome: Ongoing  Goal: Patient specific goal  Description  Patient specific goal  Outcome: Ongoing  Goal: Participates in care planning  Description  Participates in care planning  Outcome: Ongoing     Problem: Altered Mood, Deterioration in Function:  Goal: Ability to perform activities of daily living will improve  Description  Ability to perform activities of daily living will improve  Outcome: Ongoing  Goal: Able to verbalize reality based thinking  Description  Able to verbalize reality based thinking  Outcome: Ongoing  Goal: Skin appearance normal  Description  Skin appearance normal  Outcome: Ongoing  Goal: Maintenance of adequate nutrition will improve  Description  Maintenance of adequate nutrition will improve  Outcome: Ongoing  Goal: Ability to tolerate increased activity will improve  Description  Ability to tolerate increased activity will improve  Outcome: Ongoing  Goal: Participates in care planning  Description  Participates in care planning  Outcome: Ongoing  Goal: Patient specific goal  Description  Patient specific goal  Outcome: Ongoing     Problem: Risk of Harm:  Goal: Ability to remain free from injury will improve  Description  Ability to remain free from injury will improve  Outcome: Ongoing     Problem: Suicide risk  Goal: Provide patient with safe environment  Description  Provide patient with safe environment  Outcome: Ongoing

## 2019-07-31 NOTE — PROGRESS NOTES
Resp: 17   Temp: 99 °F (37.2 °C)   SpO2: 96%          Mental Status:  Level of consciousness:  within normal limits and awake  Appearance:  well-appearing, street clothes, in chair, fair grooming and good hygiene  Behavior/Motor:  no abnormalities noted  Attitude toward examiner:  cooperative, attentive and good eye contact  Speech:  normal rate and normal volume  Mood:  \"I am feeling better today. \"  Affect:  mood congruent  Thought processes:  linear and goal directed  Thought content:  Homocidal ideation : denies  Suicidal Ideation:  passive  Delusions:  no evidence of delusions  Perceptual Disturbance:  denies any perceptual disturbance  Cognition:  oriented to person, place, and time  Concentration : fair  Memory intact for recent and remote  Fund of knowledge:  average  Abstract thinking:  adequate  Insight:  improved  Judgment:  good     busPIRone  15 mg Oral TID    OLANZapine  15 mg Oral Nightly    lithium  300 mg Oral TID     vitamin D  50,000 Units Oral Weekly    vitamin B-12  500 mcg Oral Daily       Current Medications:  Current Facility-Administered Medications   Medication Dose Route Frequency Provider Last Rate Last Dose    clonazePAM (KLONOPIN) tablet 0.5 mg  0.5 mg Oral BID PRN Mitchell Jimenezor, APRN   0.5 mg at 07/30/19 1220    busPIRone (BUSPAR) tablet 15 mg  15 mg Oral TID Mitchell Razor, APRN   15 mg at 07/30/19 2026    hydrOXYzine (ATARAX) tablet 50 mg  50 mg Oral Q4H PRN Aleta Leone MD   50 mg at 07/30/19 2026    OLANZapine (ZYPREXA) tablet 15 mg  15 mg Oral Nightly Aleta Leone MD   15 mg at 07/30/19 2026    lithium capsule 300 mg  300 mg Oral TID  Aleta Leone MD   300 mg at 07/30/19 1648    vitamin D (ERGOCALCIFEROL) capsule 50,000 Units  50,000 Units Oral Weekly Kait Pittman MD   50,000 Units at 07/25/19 1443    vitamin B-12 (CYANOCOBALAMIN) tablet 500 mcg  500 mcg Oral Daily Kait Pittman MD   500 mcg at 07/30/19 1220    acetaminophen (TYLENOL) tablet

## 2019-07-31 NOTE — PROGRESS NOTES
Treatment Team Note:     ALMA met with [address] [place] [address] team to discuss Pts [place] and DC plans.      Progress/Behavior/Group Attendance: TBD     Target Symptoms/Reason for admission:  Patient admitted to [facility] due to complaints of suicidal ideation and homicidal ideation he states that he has had thoughts of hanging himself and choking the preacher out.  The patient is currently and a homeless shelter awaiting a rehab bed and he states he has not taken his Remeron or anxiety medicine in approximately 2 weeks. Ryan Rodriguez had been told at one point by his  if that if he has bad thoughts to tell someone and he did.     Diagnoses: Schizoaffective disorder, bipolar type      UDS: Neg-      [address]     Pt lives with: SW will meet with pt to gather information.     Collateral obtained from: SW will meet with pt to gather release of information.   On:     Family Session: LYNDA     Misc:

## 2019-08-01 PROCEDURE — 1240000000 HC EMOTIONAL WELLNESS R&B

## 2019-08-01 PROCEDURE — 6370000000 HC RX 637 (ALT 250 FOR IP): Performed by: FAMILY MEDICINE

## 2019-08-01 PROCEDURE — 6370000000 HC RX 637 (ALT 250 FOR IP): Performed by: PSYCHIATRY & NEUROLOGY

## 2019-08-01 PROCEDURE — 6370000000 HC RX 637 (ALT 250 FOR IP): Performed by: NURSE PRACTITIONER

## 2019-08-01 PROCEDURE — 99231 SBSQ HOSP IP/OBS SF/LOW 25: CPT | Performed by: NURSE PRACTITIONER

## 2019-08-01 PROCEDURE — 5130000000 HC BRIDGE APPOINTMENT

## 2019-08-01 RX ORDER — LITHIUM CARBONATE 300 MG/1
300 CAPSULE ORAL 2 TIMES DAILY WITH MEALS
Qty: 90 CAPSULE | Refills: 0 | Status: SHIPPED | OUTPATIENT
Start: 2019-08-01

## 2019-08-01 RX ORDER — OLANZAPINE 10 MG/1
10 TABLET ORAL NIGHTLY
Qty: 30 TABLET | Refills: 0 | Status: SHIPPED | OUTPATIENT
Start: 2019-08-01

## 2019-08-01 RX ORDER — ERGOCALCIFEROL (VITAMIN D2) 1250 MCG
50000 CAPSULE ORAL WEEKLY
Qty: 12 CAPSULE | Refills: 0 | Status: SHIPPED | OUTPATIENT
Start: 2019-08-01 | End: 2019-10-18

## 2019-08-01 RX ORDER — HYDROXYZINE HYDROCHLORIDE 25 MG/1
25 TABLET, FILM COATED ORAL EVERY 8 HOURS PRN
Qty: 30 TABLET | Refills: 0 | Status: SHIPPED | OUTPATIENT
Start: 2019-08-01 | End: 2019-08-11

## 2019-08-01 RX ORDER — BUSPIRONE HYDROCHLORIDE 15 MG/1
15 TABLET ORAL 3 TIMES DAILY
Qty: 90 TABLET | Refills: 0 | Status: SHIPPED | OUTPATIENT
Start: 2019-08-01

## 2019-08-01 RX ORDER — TRAZODONE HYDROCHLORIDE 50 MG/1
50 TABLET ORAL NIGHTLY PRN
Qty: 30 TABLET | Refills: 0 | Status: SHIPPED | OUTPATIENT
Start: 2019-08-01

## 2019-08-01 RX ADMIN — LITHIUM CARBONATE 300 MG: 150 CAPSULE, GELATIN COATED ORAL at 13:49

## 2019-08-01 RX ADMIN — ERGOCALCIFEROL 50000 UNITS: 1.25 CAPSULE ORAL at 07:53

## 2019-08-01 RX ADMIN — BUSPIRONE HYDROCHLORIDE 15 MG: 15 TABLET ORAL at 20:47

## 2019-08-01 RX ADMIN — OLANZAPINE 10 MG: 10 TABLET, FILM COATED ORAL at 20:47

## 2019-08-01 RX ADMIN — LITHIUM CARBONATE 300 MG: 150 CAPSULE, GELATIN COATED ORAL at 18:32

## 2019-08-01 RX ADMIN — CLONAZEPAM 0.5 MG: 0.5 TABLET ORAL at 20:49

## 2019-08-01 RX ADMIN — TRAZODONE HYDROCHLORIDE 50 MG: 50 TABLET ORAL at 20:47

## 2019-08-01 RX ADMIN — CYANOCOBALAMIN TAB 500 MCG 500 MCG: 500 TAB at 18:32

## 2019-08-01 RX ADMIN — LITHIUM CARBONATE 300 MG: 150 CAPSULE, GELATIN COATED ORAL at 07:53

## 2019-08-01 RX ADMIN — BUSPIRONE HYDROCHLORIDE 15 MG: 15 TABLET ORAL at 13:49

## 2019-08-01 RX ADMIN — BUSPIRONE HYDROCHLORIDE 15 MG: 15 TABLET ORAL at 07:53

## 2019-08-01 ASSESSMENT — PAIN SCALES - GENERAL: PAINLEVEL_OUTOF10: 0

## 2019-08-01 NOTE — PROGRESS NOTES
[address] [place] [address]      Psychiatric Progress Note    Name:  Fina Box  Date:  8/1/2019  Age:  34 y.o. Sex:  male  Ethnicity:   Primary Care Physician:  No primary care provider on file. Patient Care Team:  No care team member to display  Chief Complaint: \"I am suicidal, I have a plan every time. \"        Historian: patient  Complaint Type: anxiety, depression, fatigue, loss of interest in favorite activities, mood swings, sleep disturbance and tobacco use  Course of Symptoms: improved  Precipitating Factors: history of substance abuse and mental illness        Subjective  Patient reports sleep as \"not good. \" He denies SI, HI and psychosis at this time. He is anxious this morning to get into inpatient CD treatment. He has been calling his  several times as well as his  and they have not answered the phone. He reports the he feels he is \"getting he run around from them. \" He does want to go to treatment however feels like it will not happen. Patient has been calm and cooperative with staff and peers. Patient has been compliant with medications. Patient has been attending groups. Patient reports appetite as \"good. \"  Patient reports no side effects from medications. Previous Psychiatric/Substance Use History      Medical History:  Past Medical History:   Diagnosis Date    Bipolar depression ([facility] [address]     Depression     PTSD (post-traumatic stress disorder)     does not know from what source        GUERRA History:   Social History     Substance and Sexual Activity   Alcohol Use Not on file         Social History     Substance and Sexual Activity   Drug Use Yes    Types: Methamphetamines        Social History     Tobacco Use   Smoking Status Current Every Day Smoker        Family History:     History reviewed. No pertinent family history.       Vital Signs:  Last set of tests and vitals:  Vitals:    08/01/19 0747   BP: 117/76   Pulse: 91   Resp: 20 Temp: 97.5 °F (36.4 °C)   SpO2: 98%          Mental Status:  Level of consciousness:  within normal limits and awake  Appearance:  well-appearing, street clothes, in chair, good grooming and good hygiene  Behavior/Motor:  no abnormalities noted  Attitude toward examiner:  cooperative, attentive and good eye contact  Speech:  normal rate and normal volume  Mood:  \" I am feeling better. \"  Affect:  mood congruent  Thought processes:  linear and goal directed  Thought content:  Homocidal ideation : denies  Suicidal Ideation:  denies suicidal ideation  Delusions:  no evidence of delusions  Perceptual Disturbance:  denies any perceptual disturbance  Cognition:  oriented to person, place, and time  Concentration : fair  Memory intact for recent and remote  Fund of knowledge: average  Abstract thinking:  adequate  Insight:  improved  Judgment: fair     OLANZapine  10 mg Oral Nightly    busPIRone  15 mg Oral TID    lithium  300 mg Oral TID     vitamin D  50,000 Units Oral Weekly    vitamin B-12  500 mcg Oral Daily       Current Medications:  Current Facility-Administered Medications   Medication Dose Route Frequency Provider Last Rate Last Dose    OLANZapine (ZYPREXA) tablet 10 mg  10 mg Oral Nightly Mary Ramirez, APRN   10 mg at 07/31/19 2034    clonazePAM (KLONOPIN) tablet 0.5 mg  0.5 mg Oral BID PRN Mary Ramirez APRN   0.5 mg at 07/31/19 2034    busPIRone (BUSPAR) tablet 15 mg  15 mg Oral TID Mary Ramirez, APRN   15 mg at 08/01/19 0753    hydrOXYzine (ATARAX) tablet 50 mg  50 mg Oral Q4H PRN Merrianne Hodgkin, MD   50 mg at 07/30/19 2026    lithium capsule 300 mg  300 mg Oral TID  Merrianne Hodgkin, MD   300 mg at 08/01/19 0753    vitamin D (ERGOCALCIFEROL) capsule 50,000 Units  50,000 Units Oral Weekly Chasidy Jacques MD   50,000 Units at 08/01/19 0753    vitamin B-12 (CYANOCOBALAMIN) tablet 500 mcg  500 mcg Oral Daily Chasidy Jacques MD   500 mcg at 07/31/19 0926    acetaminophen (TYLENOL) tablet 650 mg  650 mg Oral Q4H PRN Rosa Elena Stewart MD        magnesium hydroxide (MILK OF MAGNESIA) 400 MG/5ML suspension 30 mL  30 mL Oral Daily PRN Rosa Elena Stewart MD        traZODone (DESYREL) tablet 50 mg  50 mg Oral Nightly PRN TOMI Quiroga   50 mg at 07/31/19 2034       Psychotherapy:   SUPPORTIVE    DSM V Diagnoses:      ACTIVE MEDICAL PROBLEMS:  Principal Problem:    Schizoaffective disorder, bipolar type ([facility] [address]  Active Problems:    Suicidal ideation    Homicidal ideation    Schizoaffective disorder ([facility] [address]  Resolved Problems:    * No resolved hospital problems. *            Plan:    Encourage group therapy  15 minute safety checks  Medical monitoring by Dr. Kai José and associates  Continue current therapy and medications  Discharge in the morning     Amount of time spent with patient:  15 minutes with greater than 50% of the time spent in counseling and collaboration of care.     TOMI Quiroga  Clinician Signature: signed electronically

## 2019-08-01 NOTE — PLAN OF CARE
Problem: Altered Mood, Depressive Behavior:  Goal: Able to verbalize acceptance of life and situations over which he or she has no control  Description  Able to verbalize acceptance of life and situations over which he or she has no control  8/1/2019 1550 by Brodie Brennan  Outcome: Ongoing  Note:                                                                       Group Therapy Note    Date: 8/1/2019  Start Time: 1430  End Time:  1530  Number of Participants: 6    Type of Group: [facility] Information  Module Name:  [address] [place] [address]  Session Number:  1    Group Goal for Pt: To improve knowledge of practical facts about depression    Notes:  Pt demonstrated improved knowledge of practical facts about depression by actively participating in group discussion.     Status After Intervention:  Unchanged    Participation Level: Interactive    Participation Quality: Attentive      Speech:  normal      Thought Process/Content: Logical      Affective Functioning: Congruent      Mood: anxious and depressed      Level of consciousness:  Alert and Oriented x4      Response to Learning: Able to verbalize current knowledge/experience, Able to verbalize/acknowledge new learning and Progressing to goal      Endings: None Reported    Modes of Intervention: Education      Discipline Responsible: Psychoeducational Specialist      Signature:  Brodie Brennan

## 2019-08-01 NOTE — PLAN OF CARE
Problem: Depressive Behavior With or Without Suicide Precautions:  Goal: Able to verbalize acceptance of life and situations over which he or she has no control  Description  Able to verbalize acceptance of life and situations over which he or she has no control  8/1/2019 1227 by Rah Wheeler LPC  Outcome: Met This Shift     Group Therapy Note     Date: 8/1/2019  Start Time: 1000  End Time:  1045  Number of Participants: 7     Type of Group: Psychotherapy     Patient's Goal: Patient will process emotions and actions and how to relate to other or their with others. Patient discussed open communication and feelings and emotions. Notes:  Patient attended process group as scheduled, patient identified a issue to work on today regarding how they will interact and relate to others. Status After Intervention:  Improved     Participation Level:  Active Listener     Participation Quality: Appropriate, Attentive and Sharing        Speech:  normal        Thought Process/Content: Logical        Affective Functioning: Congruent        Mood: euthymic        Level of consciousness:  Alert        Response to Learning: Able to verbalize current knowledge/experience        Endings: None Reported     Modes of Intervention: Education, Support and Socialization        Discipline Responsible: /Counselor        Signature:  Rah Wheeler [facility]

## 2019-08-02 VITALS
SYSTOLIC BLOOD PRESSURE: 144 MMHG | TEMPERATURE: 97.2 F | HEART RATE: 68 BPM | OXYGEN SATURATION: 100 % | DIASTOLIC BLOOD PRESSURE: 82 MMHG | RESPIRATION RATE: 18 BRPM

## 2019-08-02 LAB — LITHIUM LEVEL: 0.5 MMOL/L (ref 0.6–1.2)

## 2019-08-02 PROCEDURE — 80178 ASSAY OF LITHIUM: CPT

## 2019-08-02 PROCEDURE — 6370000000 HC RX 637 (ALT 250 FOR IP): Performed by: NURSE PRACTITIONER

## 2019-08-02 PROCEDURE — 36415 COLL VENOUS BLD VENIPUNCTURE: CPT

## 2019-08-02 PROCEDURE — 6370000000 HC RX 637 (ALT 250 FOR IP): Performed by: FAMILY MEDICINE

## 2019-08-02 PROCEDURE — 99238 HOSP IP/OBS DSCHRG MGMT 30/<: CPT | Performed by: NURSE PRACTITIONER

## 2019-08-02 PROCEDURE — 6370000000 HC RX 637 (ALT 250 FOR IP): Performed by: PSYCHIATRY & NEUROLOGY

## 2019-08-02 RX ADMIN — BUSPIRONE HYDROCHLORIDE 15 MG: 15 TABLET ORAL at 07:39

## 2019-08-02 RX ADMIN — CLONAZEPAM 0.5 MG: 0.5 TABLET ORAL at 07:39

## 2019-08-02 RX ADMIN — CYANOCOBALAMIN TAB 500 MCG 500 MCG: 500 TAB at 07:39

## 2019-08-02 RX ADMIN — LITHIUM CARBONATE 300 MG: 150 CAPSULE, GELATIN COATED ORAL at 07:39

## 2019-08-02 NOTE — PROGRESS NOTES
Treatment Team Note:     ALMA met with [address] team to discuss Pts TX and DC plans.      Progress/Behavior/Group Attendance: attending group     Target Symptoms/Reason for admission:  Patient admitted to [facility] due to complaints of suicidal ideation and homicidal ideation he states that he has had thoughts of hanging himself and choking the preacher out.  The patient is currently and a homeless shelter awaiting a rehab bed and he states he has not taken his Remeron or anxiety medicine in approximately 2 weeks. Aaron Flower had been told at one point by his  if that if he has bad thoughts to tell someone and he did.     Diagnoses: Schizoaffective disorder, bipolar type      UDS: Neg-      BAL: Neg     AftercarePlan: compass counseling in [address] lives with: homeless     Collateral obtained from: mother  On:     Family Session: LYNDA     Misc:    Statement Selected

## 2019-08-02 NOTE — PROGRESS NOTES
[address] [facility]  Discharge Note  Bridge Appointment completed: Reviewed Discharge Instructions with patient. Patient verbalizes understanding and agreement with the discharge plan using the teachback method. Referral for Outpatient Tobacco Cessation Counseling, upon discharge (annie X if applicable and completed): (x )  Hospital staff assisted patient to call Quit Line or faxed referral                                   during hospitalization                  ( )  Recognizing danger situations (included triggers and roadblocks), if not completed on admission                    ( )  Coping skills (new ways to manage stress, exercise, relaxation techniques, changing routine, distraction), if not completed on admission                                                           ( )  Basic information about quitting (benefits of quitting, techniques in how to quit, available resources, if not completed on admission  ( ) Referral for counseling faxed to [facility]   ( ) Patient refused referral  ( ) Patient refused counseling  ( ) Patient refused smoking cessation medication upon discharge    Vaccinations (annie X if applicable and completed): (x ) Patient states already received influenza vaccine elsewhere  ( ) Patient received influenza vaccine during this hospitalization  ( ) Patient refused influenza vaccine at this time      Pt discharged with followings belongings:   Dentures: None  Vision - Corrective Lenses: None  Hearing Aid: None  Jewelry: None  Body Piercings Removed: N/A     Valuables retrieved from safe and returned to patient. Patient left department with staff    Via amblatory  , discharged to home  . Patient education on aftercare instructions: yes  Patient verbalize understanding of AVS:  yes. SI? no plan AVH? none HI?  Negative for homicidal ideation       Status EXAM upon discharge:  Status and Exam  Normal: Yes  Facial Expression: Brightened  Affect: Congruent  Level
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
